# Patient Record
Sex: MALE | Race: WHITE | NOT HISPANIC OR LATINO
[De-identification: names, ages, dates, MRNs, and addresses within clinical notes are randomized per-mention and may not be internally consistent; named-entity substitution may affect disease eponyms.]

---

## 2018-12-01 ENCOUNTER — RECORD ABSTRACTING (OUTPATIENT)
Age: 78
End: 2018-12-01

## 2018-12-01 DIAGNOSIS — Z78.9 OTHER SPECIFIED HEALTH STATUS: ICD-10-CM

## 2018-12-01 DIAGNOSIS — Z82.49 FAMILY HISTORY OF ISCHEMIC HEART DISEASE AND OTHER DISEASES OF THE CIRCULATORY SYSTEM: ICD-10-CM

## 2018-12-04 ENCOUNTER — APPOINTMENT (OUTPATIENT)
Dept: CARDIOLOGY | Facility: CLINIC | Age: 78
End: 2018-12-04
Payer: MEDICARE

## 2018-12-04 VITALS
BODY MASS INDEX: 24.44 KG/M2 | RESPIRATION RATE: 16 BRPM | WEIGHT: 165 LBS | DIASTOLIC BLOOD PRESSURE: 80 MMHG | HEART RATE: 78 BPM | HEIGHT: 69 IN | SYSTOLIC BLOOD PRESSURE: 150 MMHG

## 2018-12-04 DIAGNOSIS — R42 DIZZINESS AND GIDDINESS: ICD-10-CM

## 2018-12-04 DIAGNOSIS — H54.7 UNSPECIFIED VISUAL LOSS: ICD-10-CM

## 2018-12-04 PROCEDURE — 93000 ELECTROCARDIOGRAM COMPLETE: CPT

## 2018-12-04 PROCEDURE — 99214 OFFICE O/P EST MOD 30 MIN: CPT

## 2018-12-04 NOTE — ASSESSMENT
[FreeTextEntry1] : ECG:  Normal sinus rhythm 78.  Poor R-wave progression with ventricular conduction delay.  No significant ST-T wave changes. \par \par Laboratory data was obtained and reviewed dated 11/7/18.\par \par Cholesterol is 214 up from 194. \par \par HDL 64. \par \par  up from 112.  \par \par Creatinine 1.16. \par \par LFTs are normal. \par \par A1C 5.4. \par \par Hemoglobin 15.  \par \par Impression:\par 1.  Episodes of what sounds like vertigo and not syncope.  I discussed these findings with him, the likelihood that they originate from a benign inner ear process.  A cardiac workup would not seem to be necessary. \par 2.  His dilated thoracic aorta will be reassessed by echocardiography. \par 3.  A carotid study will be obtained because of the increase in lipids.  \par 4.  He will adjust his diet and repeat the lipid values in three months.  Clinical follow up after the above.  \par

## 2018-12-04 NOTE — REASON FOR VISIT
[FreeTextEntry1] : This is a 78-year-old male who presents here for cardiac reevaluation.  His history includes:\par 1.  A dilated ascending thoracic aorta. \par 2.  Family history of premature coronary disease. \par 3.  Legal blindness with visual impairment.  \par 4.  History of anxiety and palpitations. \par \par Has had two episodes of falling to the floor after getting up out of bed.  Describes a sensation that the room is moving about him quickly with excessive nausea and vomiting.  Symptoms dissipated after about 40 minutes.  This this has been happening for years and it is not necessarily any different from what he’s had in the past.  \par \par There is no chest pain, no palpitations, no PND, orthopnea, syncope, or near-syncope. \par \par

## 2018-12-04 NOTE — HISTORY OF PRESENT ILLNESS
[FreeTextEntry1] : ECG:  Normal sinus rhythm 78.  Poor R-wave progression with ventricular conduction delay.  No significant ST-T wave changes.   Laboratory data was obtained and reviewed dated 11/7/18.  Cholesterol is 214 up from 194.   HDL 64.    up from 112.    Creatinine 1.16.   LFTs are normal.   A1C 5.4.   Hemoglobin 15.    Impression: 1.  Episodes of what sounds like vertigo and not syncope.  I discussed these findings with him, the likelihood that they originate from a benign inner ear process.  A cardiac workup would not seem to be necessary.  2.  His dilated thoracic aorta will be reassessed by echocardiography.  3.  A carotid study will be obtained because of the increase in lipids.   4.  He will adjust his diet and repeat the lipid values in three months.  Clinical follow up after the above.

## 2018-12-04 NOTE — PHYSICAL EXAM
[General Appearance - Well Developed] : well developed [Normal Appearance] : normal appearance [Well Groomed] : well groomed [General Appearance - Well Nourished] : well nourished [No Deformities] : no deformities [General Appearance - In No Acute Distress] : no acute distress [Normal Oral Mucosa] : normal oral mucosa [No Oral Pallor] : no oral pallor [No Oral Cyanosis] : no oral cyanosis [Normal Jugular Venous A Waves Present] : normal jugular venous A waves present [Normal Jugular Venous V Waves Present] : normal jugular venous V waves present [No Jugular Venous Srinivasan A Waves] : no jugular venous srinivasan A waves [Respiration, Rhythm And Depth] : normal respiratory rhythm and effort [Exaggerated Use Of Accessory Muscles For Inspiration] : no accessory muscle use [Auscultation Breath Sounds / Voice Sounds] : lungs were clear to auscultation bilaterally [Abdomen Soft] : soft [Abdomen Tenderness] : non-tender [Abdomen Mass (___ Cm)] : no abdominal mass palpated [Abnormal Walk] : normal gait [Gait - Sufficient For Exercise Testing] : the gait was sufficient for exercise testing [Nail Clubbing] : no clubbing of the fingernails [Cyanosis, Localized] : no localized cyanosis [Petechial Hemorrhages (___cm)] : no petechial hemorrhages [Skin Color & Pigmentation] : normal skin color and pigmentation [] : no rash [No Venous Stasis] : no venous stasis [Skin Lesions] : no skin lesions [No Skin Ulcers] : no skin ulcer [No Xanthoma] : no  xanthoma was observed [Oriented To Time, Place, And Person] : oriented to person, place, and time [Affect] : the affect was normal [Mood] : the mood was normal [No Anxiety] : not feeling anxious [FreeTextEntry1] :                    Well appearing and nourished with no obvious deformities or distress.\par \par Eyes: \par No conjunctival injection and no xanthelasmas.\par HEENT: \par Normocephalic.Normal oral mucosa. No pallor or cyanosis\par Neck: \par No jugular venous distension. with normal A and V wave forms. No palpable adenopathy.\par Cardiovascular: \par Normal rate and rhythm with normal S1, S2 and a grade 1/6 systolic murmur. Distal arterial pulses are normal. No significant peripheral edema.\par Pulmonary: \par Lungs are clear to auscultation and percussion. Normal respiratory pattern without any accessory muscle use\par Abdomen: \par Soft, non-tender ; no palpable organomegaly or masses.\par Extremities:\par No digital clubbing, cyanosis or ischemic changes.\par Skin: \par No skin lesions, rashes, ulcers or xanthomas.\par Cardiac:\par Nl S1 S2 with a grade 1/6  SHANTAL and no significant  gallops or rubs.

## 2019-03-25 ENCOUNTER — APPOINTMENT (OUTPATIENT)
Dept: CARDIOLOGY | Facility: CLINIC | Age: 79
End: 2019-03-25
Payer: MEDICARE

## 2019-03-25 PROCEDURE — 93306 TTE W/DOPPLER COMPLETE: CPT

## 2019-03-25 PROCEDURE — 93880 EXTRACRANIAL BILAT STUDY: CPT

## 2019-04-02 ENCOUNTER — NON-APPOINTMENT (OUTPATIENT)
Age: 79
End: 2019-04-02

## 2019-04-02 ENCOUNTER — APPOINTMENT (OUTPATIENT)
Dept: CARDIOLOGY | Facility: CLINIC | Age: 79
End: 2019-04-02
Payer: MEDICARE

## 2019-04-02 VITALS
SYSTOLIC BLOOD PRESSURE: 130 MMHG | HEIGHT: 69 IN | WEIGHT: 169 LBS | HEART RATE: 67 BPM | BODY MASS INDEX: 25.03 KG/M2 | DIASTOLIC BLOOD PRESSURE: 70 MMHG | RESPIRATION RATE: 16 BRPM

## 2019-04-02 DIAGNOSIS — K21.9 GASTRO-ESOPHAGEAL REFLUX DISEASE W/OUT ESOPHAGITIS: ICD-10-CM

## 2019-04-02 PROCEDURE — 93000 ELECTROCARDIOGRAM COMPLETE: CPT

## 2019-04-02 PROCEDURE — 99214 OFFICE O/P EST MOD 30 MIN: CPT

## 2019-04-02 RX ORDER — PANTOPRAZOLE SODIUM 20 MG/1
20 TABLET, DELAYED RELEASE ORAL DAILY
Refills: 0 | Status: ACTIVE | COMMUNITY

## 2019-04-02 NOTE — ASSESSMENT
[FreeTextEntry1] : ECG:  Normal sinus rhythm 67 Poor R-wave progression with right ventricular conduction delay.  No significant ST-T wave changes. \par \par Carotid study 3/25/19:\par Mild right internal carotid artery plaquing.\par Left internal carotid artery is clean.\par \par Echocardiogram 3/25/19:\par Left ventricular ejection fraction 55-60%.\par Left atrial enlargement.\par Mild aortic and mitral insufficiency.\par Mild dilatation of the ascending aorta 4 cm unchanged from prior  study\par \par Laboratory data 3/20/19:\par Cholesterol 203\par HDL 63\par \par LFTs normal.\par \par Impression:\par 1.  Previously described pisodes of what sounds like vertigo and not syncope.  I discussed these findings with him, the likelihood that they originate from a benign inner ear process.  A cardiac workup would not seem to be necessary. \par 2.  His dilated thoracic aorta mildly dilated and unchanged  by echocardiography. \par 3.  A carotid study obtained because of the increase in lipids shows minimal disease.  \par 4.  He will adjust his diet and repeat the lipid values in  6 months.  Clinical follow up after the above.  \par

## 2019-04-02 NOTE — REASON FOR VISIT
[FreeTextEntry1] : This is a 78-year-old male who presents here for cardiac reevaluation.  His history includes:\par 1.  A dilated ascending thoracic aorta. \par 2.  Family history of premature coronary disease. \par 3.  Legal blindness with visual impairment.  \par 4.  History of anxiety and palpitations. \par \par No recurent episodes of falling to the floor This has been happening for years and it is not necessarily any different from what he’s had in the past.  \par \par There is no chest pain, no palpitations, no PND, orthopnea, syncope, or near-syncope. \par \par

## 2019-11-14 ENCOUNTER — NON-APPOINTMENT (OUTPATIENT)
Age: 79
End: 2019-11-14

## 2019-11-14 ENCOUNTER — APPOINTMENT (OUTPATIENT)
Dept: CARDIOLOGY | Facility: CLINIC | Age: 79
End: 2019-11-14
Payer: MEDICARE

## 2019-11-14 VITALS
DIASTOLIC BLOOD PRESSURE: 70 MMHG | HEIGHT: 69 IN | HEART RATE: 67 BPM | OXYGEN SATURATION: 98 % | BODY MASS INDEX: 24.44 KG/M2 | WEIGHT: 165 LBS | RESPIRATION RATE: 16 BRPM | SYSTOLIC BLOOD PRESSURE: 130 MMHG

## 2019-11-14 DIAGNOSIS — Z00.00 ENCOUNTER FOR GENERAL ADULT MEDICAL EXAMINATION W/OUT ABNORMAL FINDINGS: ICD-10-CM

## 2019-11-14 DIAGNOSIS — F41.9 ANXIETY DISORDER, UNSPECIFIED: ICD-10-CM

## 2019-11-14 PROCEDURE — 99214 OFFICE O/P EST MOD 30 MIN: CPT

## 2019-11-14 PROCEDURE — 93000 ELECTROCARDIOGRAM COMPLETE: CPT

## 2019-11-14 NOTE — PHYSICAL EXAM
[Normal Appearance] : normal appearance [General Appearance - Well Developed] : well developed [General Appearance - Well Nourished] : well nourished [Well Groomed] : well groomed [General Appearance - In No Acute Distress] : no acute distress [No Deformities] : no deformities [Normal Oral Mucosa] : normal oral mucosa [No Oral Pallor] : no oral pallor [No Oral Cyanosis] : no oral cyanosis [Normal Jugular Venous A Waves Present] : normal jugular venous A waves present [Normal Jugular Venous V Waves Present] : normal jugular venous V waves present [No Jugular Venous Srinivasan A Waves] : no jugular venous srinivasan A waves [Respiration, Rhythm And Depth] : normal respiratory rhythm and effort [Exaggerated Use Of Accessory Muscles For Inspiration] : no accessory muscle use [Auscultation Breath Sounds / Voice Sounds] : lungs were clear to auscultation bilaterally [Abdomen Soft] : soft [Abdomen Mass (___ Cm)] : no abdominal mass palpated [Abdomen Tenderness] : non-tender [Abnormal Walk] : normal gait [Gait - Sufficient For Exercise Testing] : the gait was sufficient for exercise testing [Nail Clubbing] : no clubbing of the fingernails [Cyanosis, Localized] : no localized cyanosis [Petechial Hemorrhages (___cm)] : no petechial hemorrhages [Skin Color & Pigmentation] : normal skin color and pigmentation [No Venous Stasis] : no venous stasis [] : no rash [No Skin Ulcers] : no skin ulcer [Skin Lesions] : no skin lesions [No Xanthoma] : no  xanthoma was observed [Oriented To Time, Place, And Person] : oriented to person, place, and time [Affect] : the affect was normal [Mood] : the mood was normal [No Anxiety] : not feeling anxious [FreeTextEntry1] :                    Well appearing and nourished with no obvious deformities or distress.\par \par Eyes: \par No conjunctival injection and no xanthelasmas.\par HEENT: \par Normocephalic.Normal oral mucosa. No pallor or cyanosis\par Neck: \par No jugular venous distension. with normal A and V wave forms. No palpable adenopathy.\par Cardiovascular: \par Normal rate and rhythm with normal S1, S2 and a grade 1/6 systolic murmur. Distal arterial pulses are normal. No significant peripheral edema.\par Pulmonary: \par Lungs are clear to auscultation and percussion. Normal respiratory pattern without any accessory muscle use\par Abdomen: \par Soft, non-tender ; no palpable organomegaly or masses.\par Extremities:\par No digital clubbing, cyanosis or ischemic changes.\par Skin: \par No skin lesions, rashes, ulcers or xanthomas.\par Cardiac:\par Nl S1 S2 with a grade 1/6  SHANTAL and no significant  gallops or rubs.

## 2019-11-14 NOTE — ASSESSMENT
[FreeTextEntry1] : ECG:  Normal sinus rhythm 67 Poor R-wave progression with right ventricular conduction delay.  No significant ST-T wave changes. \par \par Carotid study 3/25/19:\par Mild right internal carotid artery plaquing.\par Left internal carotid artery is clean.\par \par Echocardiogram 3/25/19:\par Left ventricular ejection fraction 55-60%.\par Left atrial enlargement.\par Mild aortic and mitral insufficiency.\par Mild dilatation of the ascending aorta 4 cm unchanged from prior  study\par \par Laboratory data 3/20/19:\par Cholesterol 203\par HDL 63\par \par LFTs normal.\par \par Impression:\par 1.  No sig recurrence of dizziness\par 2.  His dilated thoracic aorta mildly dilated and unchanged  by echocardiography earlier this year. \par 3.  A carotid study obtained because of the increase in lipids shows minimal disease.  \par 4.  He will adjust his diet and repeat the lipid values in  6 months.  Clinical follow up after the above.  \par \par \par Plan:\par 1.  Will follow-up here in 6 months.\par 2.  We will repeat echocardiogram at that time.\par 3.  Copies of laboratory data from the primary care will be obtained and reviewed.

## 2020-07-14 ENCOUNTER — APPOINTMENT (OUTPATIENT)
Dept: CARDIOLOGY | Facility: CLINIC | Age: 80
End: 2020-07-14

## 2020-09-03 ENCOUNTER — APPOINTMENT (OUTPATIENT)
Dept: CARDIOLOGY | Facility: CLINIC | Age: 80
End: 2020-09-03

## 2020-09-08 ENCOUNTER — APPOINTMENT (OUTPATIENT)
Dept: CARDIOLOGY | Facility: CLINIC | Age: 80
End: 2020-09-08

## 2020-09-09 ENCOUNTER — APPOINTMENT (OUTPATIENT)
Dept: CARDIOLOGY | Facility: CLINIC | Age: 80
End: 2020-09-09

## 2020-11-06 ENCOUNTER — APPOINTMENT (OUTPATIENT)
Dept: CARDIOLOGY | Facility: CLINIC | Age: 80
End: 2020-11-06
Payer: MEDICARE

## 2020-11-06 PROCEDURE — 93306 TTE W/DOPPLER COMPLETE: CPT

## 2020-11-10 ENCOUNTER — APPOINTMENT (OUTPATIENT)
Dept: CARDIOLOGY | Facility: CLINIC | Age: 80
End: 2020-11-10
Payer: MEDICARE

## 2020-11-10 ENCOUNTER — NON-APPOINTMENT (OUTPATIENT)
Age: 80
End: 2020-11-10

## 2020-11-10 VITALS
WEIGHT: 169 LBS | TEMPERATURE: 97.7 F | BODY MASS INDEX: 25.03 KG/M2 | SYSTOLIC BLOOD PRESSURE: 134 MMHG | RESPIRATION RATE: 16 BRPM | HEIGHT: 69 IN | DIASTOLIC BLOOD PRESSURE: 82 MMHG | HEART RATE: 61 BPM

## 2020-11-10 DIAGNOSIS — R00.2 PALPITATIONS: ICD-10-CM

## 2020-11-10 DIAGNOSIS — R01.1 CARDIAC MURMUR, UNSPECIFIED: ICD-10-CM

## 2020-11-10 DIAGNOSIS — R94.31 ABNORMAL ELECTROCARDIOGRAM [ECG] [EKG]: ICD-10-CM

## 2020-11-10 PROCEDURE — 99214 OFFICE O/P EST MOD 30 MIN: CPT

## 2020-11-10 PROCEDURE — 93000 ELECTROCARDIOGRAM COMPLETE: CPT

## 2020-11-10 NOTE — ASSESSMENT
[FreeTextEntry1] : ECG:  Normal sinus rhythm 61 Poor R-wave progression , incomplete right bundle branch block pattern no significant ST-T wave changes. \par \par Carotid study 3/25/19:\par Mild right internal carotid artery plaquing.\par Left internal carotid artery is clean.\par \par Echocardiogram 11/6/2020:\par Normal LV size and function\par Aorta measures 4.2 at the root 4.1 ascending\par Mild to moderate mitral and aortic insufficiency\par Left atrial enlargement\par Compared to the prior study there is no change in the aorta but there is been some progression of the mitral and aortic insufficiency.\par \par Echocardiogram 3/25/19:\par Left ventricular ejection fraction 55-60%.\par Left atrial enlargement.\par Mild aortic and mitral insufficiency.\par Mild dilatation of the ascending aorta 4 cm unchanged from prior  study\par \par Laboratory data 3/20/19: No current data\par Cholesterol 203\par HDL 63\par \par LFTs normal.\par \par Impression:\par 1.  No sig recurrence of dizziness\par 2.  His dilated thoracic aorta mildly dilated and unchanged  by echocardiography \par 3.  A carotid study obtained because of the increase in lipids showed minimal disease.  \par 4.  Mild progression of the mitral and aortic insufficiency is noted.\par He will adjust his diet and repeat the lipid values through PMD shortly.  Clinical follow up after the above.  \par \par \par Plan:\par 1.  Will follow-up here in 6 months.\par 2.  We will repeat echocardiogram in 1 year\par 3.  Copies of laboratory data from the primary care will be obtained and reviewed.

## 2020-11-10 NOTE — PHYSICAL EXAM
[General Appearance - Well Developed] : well developed [Normal Appearance] : normal appearance [Well Groomed] : well groomed [General Appearance - Well Nourished] : well nourished [No Deformities] : no deformities [General Appearance - In No Acute Distress] : no acute distress [Normal Oral Mucosa] : normal oral mucosa [No Oral Pallor] : no oral pallor [No Oral Cyanosis] : no oral cyanosis [Normal Jugular Venous A Waves Present] : normal jugular venous A waves present [Normal Jugular Venous V Waves Present] : normal jugular venous V waves present [No Jugular Venous Srinivasan A Waves] : no jugular venous srinivasna A waves [Respiration, Rhythm And Depth] : normal respiratory rhythm and effort [Exaggerated Use Of Accessory Muscles For Inspiration] : no accessory muscle use [Auscultation Breath Sounds / Voice Sounds] : lungs were clear to auscultation bilaterally [Abdomen Soft] : soft [Abdomen Tenderness] : non-tender [Abdomen Mass (___ Cm)] : no abdominal mass palpated [Abnormal Walk] : normal gait [Gait - Sufficient For Exercise Testing] : the gait was sufficient for exercise testing [Nail Clubbing] : no clubbing of the fingernails [Cyanosis, Localized] : no localized cyanosis [Petechial Hemorrhages (___cm)] : no petechial hemorrhages [Skin Color & Pigmentation] : normal skin color and pigmentation [] : no rash [No Venous Stasis] : no venous stasis [Skin Lesions] : no skin lesions [No Skin Ulcers] : no skin ulcer [No Xanthoma] : no  xanthoma was observed [Oriented To Time, Place, And Person] : oriented to person, place, and time [Affect] : the affect was normal [Mood] : the mood was normal [No Anxiety] : not feeling anxious [FreeTextEntry1] :                    Well appearing and nourished with no obvious deformities or distress.\par \par Eyes: \par No conjunctival injection and no xanthelasmas.\par HEENT: \par Normocephalic.Normal oral mucosa. No pallor or cyanosis\par Neck: \par No jugular venous distension. with normal A and V wave forms. No palpable adenopathy.\par Cardiovascular: \par Normal rate and rhythm with normal S1, S2 and a grade 1/6 systolic murmur. Distal arterial pulses are normal. No significant peripheral edema.\par Pulmonary: \par Lungs are clear to auscultation and percussion. Normal respiratory pattern without any accessory muscle use\par Abdomen: \par Soft, non-tender ; no palpable organomegaly or masses.\par Extremities:\par No digital clubbing, cyanosis or ischemic changes.\par Skin: \par No skin lesions, rashes, ulcers or xanthomas.\par Cardiac:\par Nl S1 S2 with a grade 1/6  SHANTAL and no significant  gallops or rubs.

## 2020-11-10 NOTE — REASON FOR VISIT
[FreeTextEntry1] : This is a 80 -year-old male who presents here for cardiac reevaluation.  His history includes:\par 1.  A dilated ascending thoracic aorta. \par 2.  Family history of premature coronary disease. \par 3.  Legal blindness with visual impairment.  \par 4.  History of anxiety and palpitations. \par \par No recurent episodes of falling to the floor This has been happening for years and it is not necessarily any different from what he’s had in the past.  \par \par There is no chest pain, no palpitations, no PND, orthopnea, syncope, or near-syncope. \par \par

## 2023-05-22 ENCOUNTER — OFFICE (OUTPATIENT)
Dept: URBAN - METROPOLITAN AREA CLINIC 104 | Facility: CLINIC | Age: 83
Setting detail: OPHTHALMOLOGY
End: 2023-05-22
Payer: MEDICARE

## 2023-05-22 DIAGNOSIS — H16.223: ICD-10-CM

## 2023-05-22 DIAGNOSIS — H52.13: ICD-10-CM

## 2023-05-22 DIAGNOSIS — H35.53: ICD-10-CM

## 2023-05-22 DIAGNOSIS — H43.813: ICD-10-CM

## 2023-05-22 DIAGNOSIS — H25.13: ICD-10-CM

## 2023-05-22 DIAGNOSIS — H35.54: ICD-10-CM

## 2023-05-22 PROCEDURE — 92014 COMPRE OPH EXAM EST PT 1/>: CPT | Performed by: OPHTHALMOLOGY

## 2023-05-22 PROCEDURE — 92134 CPTRZ OPH DX IMG PST SGM RTA: CPT | Performed by: OPHTHALMOLOGY

## 2023-05-22 PROCEDURE — 92015 DETERMINE REFRACTIVE STATE: CPT | Performed by: OPHTHALMOLOGY

## 2023-05-22 ASSESSMENT — TONOMETRY
OS_IOP_MMHG: 14
OD_IOP_MMHG: 14

## 2023-05-22 ASSESSMENT — REFRACTION_MANIFEST
OS_VA1: 20/FC
OS_CYLINDER: -0.50
OS_SPHERE: -1.25
OD_SPHERE: -0.50
OD_AXIS: 110
OD_VA1: 20/FC
OD_CYLINDER: -0.75
OS_AXIS: 070

## 2023-05-22 ASSESSMENT — SPHEQUIV_DERIVED
OS_SPHEQUIV: -1.75
OS_SPHEQUIV: -1.5
OD_SPHEQUIV: -0.875

## 2023-05-22 ASSESSMENT — REFRACTION_AUTOREFRACTION
OS_AXIS: 061
OS_CYLINDER: -0.50
OD_CYLINDER: -1.00
OD_SPHERE: PLANO
OS_SPHERE: -1.50
OD_AXIS: 105

## 2023-05-22 ASSESSMENT — KERATOMETRY
OD_K2POWER_DIOPTERS: 42.94
OD_AXISANGLE_DEGREES: 039
OS_K2POWER_DIOPTERS: 42.83
OD_K1POWER_DIOPTERS: 42.61
OS_AXISANGLE_DEGREES: 171
OS_K1POWER_DIOPTERS: 42.51

## 2023-05-22 ASSESSMENT — LID EXAM ASSESSMENTS
OS_COMMENTS: WOUND C/D/I, HEALING WELL
OS_TRICHIASIS: ABSENT
OD_TRICHIASIS: ABSENT

## 2023-05-22 ASSESSMENT — AXIALLENGTH_DERIVED
OS_AL: 24.516
OD_AL: 24.22
OS_AL: 24.6216

## 2023-05-22 ASSESSMENT — LID POSITION - DERMATOCHALASIS
OS_DERMATOCHALASIS: LUL 2+
OD_DERMATOCHALASIS: RUL 2+

## 2023-05-22 ASSESSMENT — VISUAL ACUITY
OD_BCVA: 20/FC
OS_BCVA: 20/FC

## 2023-05-22 ASSESSMENT — CONFRONTATIONAL VISUAL FIELD TEST (CVF)
OD_FINDINGS: FULL
OS_FINDINGS: FULL

## 2023-05-22 ASSESSMENT — SUPERFICIAL PUNCTATE KERATITIS (SPK)
OS_SPK: 1+
OD_SPK: 1+

## 2024-04-16 ENCOUNTER — APPOINTMENT (OUTPATIENT)
Dept: CARDIOLOGY | Facility: CLINIC | Age: 84
End: 2024-04-16
Payer: MEDICARE

## 2024-04-16 VITALS
WEIGHT: 170 LBS | BODY MASS INDEX: 25.18 KG/M2 | DIASTOLIC BLOOD PRESSURE: 80 MMHG | HEIGHT: 69 IN | HEART RATE: 65 BPM | OXYGEN SATURATION: 98 % | SYSTOLIC BLOOD PRESSURE: 140 MMHG | RESPIRATION RATE: 16 BRPM

## 2024-04-16 PROCEDURE — 93000 ELECTROCARDIOGRAM COMPLETE: CPT

## 2024-04-16 PROCEDURE — 99214 OFFICE O/P EST MOD 30 MIN: CPT

## 2024-04-16 PROCEDURE — G2211 COMPLEX E/M VISIT ADD ON: CPT

## 2024-04-16 NOTE — REASON FOR VISIT
[FreeTextEntry1] : This is a 83 -year-old male who presents here for cardiac reevaluation.   Last seen here 11/10/2020.  His history includes: 1.  A dilated ascending thoracic aorta.  2.  Family history of premature coronary disease.  3.  Legal blindness with visual impairment.   4.  History of anxiety and palpitations.   No recurent episodes of falling to the floor This has been happening for years and it is not necessarily any different from what he's had in the past.    There is no chest pain, no palpitations, no PND, orthopnea, syncope, or near-syncope.

## 2024-04-16 NOTE — PHYSICAL EXAM
[Normal Appearance] : normal appearance [General Appearance - Well Developed] : well developed [Well Groomed] : well groomed [No Deformities] : no deformities [General Appearance - Well Nourished] : well nourished [General Appearance - In No Acute Distress] : no acute distress [Normal Oral Mucosa] : normal oral mucosa [No Oral Pallor] : no oral pallor [No Oral Cyanosis] : no oral cyanosis [Normal Jugular Venous A Waves Present] : normal jugular venous A waves present [Normal Jugular Venous V Waves Present] : normal jugular venous V waves present [No Jugular Venous Srinivasan A Waves] : no jugular venous srinivasan A waves [Respiration, Rhythm And Depth] : normal respiratory rhythm and effort [Auscultation Breath Sounds / Voice Sounds] : lungs were clear to auscultation bilaterally [Exaggerated Use Of Accessory Muscles For Inspiration] : no accessory muscle use [Abdomen Soft] : soft [Abdomen Tenderness] : non-tender [Abdomen Mass (___ Cm)] : no abdominal mass palpated [Abnormal Walk] : normal gait [Gait - Sufficient For Exercise Testing] : the gait was sufficient for exercise testing [Nail Clubbing] : no clubbing of the fingernails [Cyanosis, Localized] : no localized cyanosis [Petechial Hemorrhages (___cm)] : no petechial hemorrhages [Skin Color & Pigmentation] : normal skin color and pigmentation [] : no rash [No Venous Stasis] : no venous stasis [Skin Lesions] : no skin lesions [No Skin Ulcers] : no skin ulcer [No Xanthoma] : no  xanthoma was observed [Oriented To Time, Place, And Person] : oriented to person, place, and time [Affect] : the affect was normal [Mood] : the mood was normal [No Anxiety] : not feeling anxious [FreeTextEntry1] :                    Well appearing and nourished with no obvious deformities or distress.\par  \par  Eyes: \par  No conjunctival injection and no xanthelasmas.\par  HEENT: \par  Normocephalic.Normal oral mucosa. No pallor or cyanosis\par  Neck: \par  No jugular venous distension. with normal A and V wave forms. No palpable adenopathy.\par  Cardiovascular: \par  Normal rate and rhythm with normal S1, S2 and a grade 1/6 systolic murmur. Distal arterial pulses are normal. No significant peripheral edema.\par  Pulmonary: \par  Lungs are clear to auscultation and percussion. Normal respiratory pattern without any accessory muscle use\par  Abdomen: \par  Soft, non-tender ; no palpable organomegaly or masses.\par  Extremities:\par  No digital clubbing, cyanosis or ischemic changes.\par  Skin: \par  No skin lesions, rashes, ulcers or xanthomas.\par  Cardiac:\par  Nl S1 S2 with a grade 1/6  SHANTAL and no significant  gallops or rubs.

## 2024-04-16 NOTE — ASSESSMENT
[FreeTextEntry1] : ECG:  Normal sinus rhythm 61 Poor R-wave progression , incomplete right bundle branch block pattern no significant ST-T wave changes.  Poor R progression but doubt septal infarct.  Carotid study 3/25/19: Mild right internal carotid artery plaquing. Left internal carotid artery is clean.  Echocardiogram 11/6/2020: Normal LV size and function Aorta measures 4.2 at the root 4.1 ascending Mild to moderate mitral and aortic insufficiency Left atrial enlargement Compared to the prior study there is no change in the aorta but there is been some progression of the mitral and aortic insufficiency.  Echocardiogram 3/25/19: Left ventricular ejection fraction 55-60%. Left atrial enlargement. Mild aortic and mitral insufficiency. Mild dilatation of the ascending aorta 4 cm unchanged from prior  study  Laboratory data  ------3/20/19--11/21/23--1/27/24-- Chol---203------220----------224 HDL----63-------48------------53 LDL---127------150-----------148 LFTs normal.  Impression: 1.  No sig recurrence of dizziness 2.  His dilated thoracic aorta mildly dilated last assessed November 2020 3.  A carotid study obtained because of the increase in lipids showed minimal disease.   4.  Mild progression of the mitral and aortic insufficiency is noted. 5.  Hyperlipidemia: Further elevation of his lipids currently taking red yeast rice only.  Has been somewhat resistant to pharmacotherapy.  Plan: 1.  CT CAC to be obtained to further risk stratify and define appropriate treatment of his hyperlipidemia. 2.  We will repeat echocardiogram by this summer. 3.  Copies of laboratory data from the primary care will be obtained and reviewed.

## 2024-05-17 ENCOUNTER — APPOINTMENT (OUTPATIENT)
Dept: CARDIOLOGY | Facility: CLINIC | Age: 84
End: 2024-05-17
Payer: MEDICARE

## 2024-05-17 PROCEDURE — 93306 TTE W/DOPPLER COMPLETE: CPT

## 2024-05-23 ENCOUNTER — APPOINTMENT (OUTPATIENT)
Dept: CARDIOLOGY | Facility: CLINIC | Age: 84
End: 2024-05-23
Payer: MEDICARE

## 2024-05-23 VITALS
DIASTOLIC BLOOD PRESSURE: 78 MMHG | BODY MASS INDEX: 25.25 KG/M2 | WEIGHT: 171 LBS | RESPIRATION RATE: 15 BRPM | HEART RATE: 70 BPM | SYSTOLIC BLOOD PRESSURE: 146 MMHG

## 2024-05-23 DIAGNOSIS — I35.1 NONRHEUMATIC AORTIC (VALVE) INSUFFICIENCY: ICD-10-CM

## 2024-05-23 DIAGNOSIS — I77.810 THORACIC AORTIC ECTASIA: ICD-10-CM

## 2024-05-23 DIAGNOSIS — I34.0 NONRHEUMATIC MITRAL (VALVE) INSUFFICIENCY: ICD-10-CM

## 2024-05-23 DIAGNOSIS — I10 ESSENTIAL (PRIMARY) HYPERTENSION: ICD-10-CM

## 2024-05-23 DIAGNOSIS — E78.5 HYPERLIPIDEMIA, UNSPECIFIED: ICD-10-CM

## 2024-05-23 PROCEDURE — 99214 OFFICE O/P EST MOD 30 MIN: CPT

## 2024-05-23 PROCEDURE — G2211 COMPLEX E/M VISIT ADD ON: CPT

## 2024-05-23 RX ORDER — ROSUVASTATIN CALCIUM 5 MG/1
5 TABLET, FILM COATED ORAL
Qty: 90 | Refills: 1 | Status: ACTIVE | COMMUNITY
Start: 2024-05-23 | End: 1900-01-01

## 2024-05-23 RX ORDER — AMLODIPINE BESYLATE 5 MG/1
5 TABLET ORAL
Qty: 90 | Refills: 1 | Status: ACTIVE | COMMUNITY
Start: 2024-05-23 | End: 1900-01-01

## 2024-05-23 NOTE — PHYSICAL EXAM
[General Appearance - Well Developed] : well developed [Normal Appearance] : normal appearance [Well Groomed] : well groomed [General Appearance - Well Nourished] : well nourished [No Deformities] : no deformities [General Appearance - In No Acute Distress] : no acute distress [Normal Oral Mucosa] : normal oral mucosa [No Oral Pallor] : no oral pallor [No Oral Cyanosis] : no oral cyanosis [Normal Jugular Venous A Waves Present] : normal jugular venous A waves present [Normal Jugular Venous V Waves Present] : normal jugular venous V waves present [No Jugular Venous Srinivasan A Waves] : no jugular venous srinivasan A waves [Respiration, Rhythm And Depth] : normal respiratory rhythm and effort [Exaggerated Use Of Accessory Muscles For Inspiration] : no accessory muscle use [Auscultation Breath Sounds / Voice Sounds] : lungs were clear to auscultation bilaterally [Abnormal Walk] : normal gait [Gait - Sufficient For Exercise Testing] : the gait was sufficient for exercise testing [Nail Clubbing] : no clubbing of the fingernails [Cyanosis, Localized] : no localized cyanosis [Petechial Hemorrhages (___cm)] : no petechial hemorrhages [Skin Color & Pigmentation] : normal skin color and pigmentation [] : no rash [No Venous Stasis] : no venous stasis [Skin Lesions] : no skin lesions [No Skin Ulcers] : no skin ulcer [No Xanthoma] : no  xanthoma was observed [Oriented To Time, Place, And Person] : oriented to person, place, and time [Affect] : the affect was normal [Mood] : the mood was normal [No Anxiety] : not feeling anxious [Heart Rate And Rhythm] : heart rate and rhythm were normal [Heart Sounds] : normal S1 and S2 [Edema] : no peripheral edema present [FreeTextEntry1] : I/VI systolic murmur

## 2024-05-23 NOTE — ASSESSMENT
[FreeTextEntry1] : Carotid study 3/25/19: Mild right internal carotid artery plaquing. Left internal carotid artery is clean.  ECHOCARDIOGRAM 5/17/2024: LVEF 55-60%, LV caivty normal in size LA mildly dilated RA normal in size Trace MR Mild to moderate AI Aortic root 4.5 cm Ascending aorta measuring 4.4 cm  Echocardiogram 11/6/2020: Normal LV size and function Aorta measures 4.2 at the root 4.1 ascending Mild to moderate mitral and aortic insufficiency Left atrial enlargement Compared to the prior study there is no change in the aorta but there is been some progression of the mitral and aortic insufficiency.  Echocardiogram 3/25/19: Left ventricular ejection fraction 55-60%. Left atrial enlargement. Mild aortic and mitral insufficiency. Mild dilatation of the ascending aorta 4 cm unchanged from prior  study  CT CALCIUM SCORE 5/2024: Total Ca: 0  Laboratory data  ------3/20/19--11/21/23--1/27/24-- Chol---203------220----------224 HDL----63-------48------------53 LDL---127------150-----------148 LFTs normal.  Impression: 1. Progression of thoracic aorta dilatation on echocardiogram. Blood pressure elevated.  2. MR now mild. AI remains mild to moderate.  3. CT calcium score of 0 suggesting lower risk, however LDL remains elevated.  4. No anginal symptoms.   Plan: 1.  Cardiac CTA to reassess thoracic aorta dilatation given progression. Also check abdominal aorta US to assess for AAA.  2. Start Amlodipine 5 mg for HTN. Importance of blood pressure control given aortic dilatation discussed.  3. Start Rosuvastatin 5 mg and repeat lipid panel in 3 months. Follow a low-fat, low-carbohydrate diet.  4. Pt advised to continue to exercise for at least 30 minutes most days of the week. Any cardiac symptoms such as chest pain, palpitations or new shortness of breath should be reported.  Clinical follow-up in 4 months or sooner if needed.

## 2024-05-23 NOTE — HISTORY OF PRESENT ILLNESS
[FreeTextEntry1] : Patient reports feeling well. Continues to exercise. Rows 1/2 hour daily and has no exertional symptoms. He denies any chest pain, dizziness, syncope, near-syncope, SOB, edema, orthopnea or PND.  Reports home BP typically 135/80.

## 2024-05-23 NOTE — REASON FOR VISIT
[FreeTextEntry1] : ANILA SCHULTZ is a 83 year-old  M presents here for cardiac follow-up and to discuss the results of his echocardiogram and coronary calcium score.  His medical history includes:  1.  A dilated ascending thoracic aorta.  2.  Family history of premature coronary disease.  3.  Legal blindness with visual impairment.   4.  History of anxiety and palpitations.

## 2024-05-24 ENCOUNTER — RX ONLY (RX ONLY)
Age: 84
End: 2024-05-24

## 2024-05-24 ENCOUNTER — OFFICE (OUTPATIENT)
Dept: URBAN - METROPOLITAN AREA CLINIC 104 | Facility: CLINIC | Age: 84
Setting detail: OPHTHALMOLOGY
End: 2024-05-24
Payer: MEDICARE

## 2024-05-24 DIAGNOSIS — H43.813: ICD-10-CM

## 2024-05-24 DIAGNOSIS — H25.13: ICD-10-CM

## 2024-05-24 DIAGNOSIS — H35.53: ICD-10-CM

## 2024-05-24 DIAGNOSIS — H35.54: ICD-10-CM

## 2024-05-24 DIAGNOSIS — H16.223: ICD-10-CM

## 2024-05-24 PROCEDURE — 92014 COMPRE OPH EXAM EST PT 1/>: CPT | Performed by: OPHTHALMOLOGY

## 2024-05-24 ASSESSMENT — LID POSITION - DERMATOCHALASIS
OS_DERMATOCHALASIS: LUL 2+
OD_DERMATOCHALASIS: RUL 2+

## 2024-05-24 ASSESSMENT — LID EXAM ASSESSMENTS
OS_COMMENTS: WOUND C/D/I, HEALING WELL
OS_TRICHIASIS: ABSENT
OD_TRICHIASIS: ABSENT

## 2024-10-09 ENCOUNTER — APPOINTMENT (OUTPATIENT)
Dept: CARDIOLOGY | Facility: CLINIC | Age: 84
End: 2024-10-09
Payer: MEDICARE

## 2024-10-09 PROCEDURE — 93978 VASCULAR STUDY: CPT

## 2024-11-14 ENCOUNTER — APPOINTMENT (OUTPATIENT)
Dept: CARDIOLOGY | Facility: CLINIC | Age: 84
End: 2024-11-14
Payer: MEDICARE

## 2024-11-14 VITALS
WEIGHT: 173 LBS | RESPIRATION RATE: 16 BRPM | BODY MASS INDEX: 25.62 KG/M2 | HEIGHT: 69 IN | OXYGEN SATURATION: 98 % | HEART RATE: 65 BPM | SYSTOLIC BLOOD PRESSURE: 132 MMHG | DIASTOLIC BLOOD PRESSURE: 70 MMHG

## 2024-11-14 DIAGNOSIS — I77.810 THORACIC AORTIC ECTASIA: ICD-10-CM

## 2024-11-14 DIAGNOSIS — I35.1 NONRHEUMATIC AORTIC (VALVE) INSUFFICIENCY: ICD-10-CM

## 2024-11-14 DIAGNOSIS — R00.2 PALPITATIONS: ICD-10-CM

## 2024-11-14 DIAGNOSIS — I34.0 NONRHEUMATIC MITRAL (VALVE) INSUFFICIENCY: ICD-10-CM

## 2024-11-14 DIAGNOSIS — E78.5 HYPERLIPIDEMIA, UNSPECIFIED: ICD-10-CM

## 2024-11-14 DIAGNOSIS — I10 ESSENTIAL (PRIMARY) HYPERTENSION: ICD-10-CM

## 2024-11-14 PROCEDURE — G2211 COMPLEX E/M VISIT ADD ON: CPT

## 2024-11-14 PROCEDURE — 99214 OFFICE O/P EST MOD 30 MIN: CPT

## 2024-11-14 PROCEDURE — 93000 ELECTROCARDIOGRAM COMPLETE: CPT

## 2025-01-06 ENCOUNTER — APPOINTMENT (OUTPATIENT)
Dept: ORTHOPEDIC SURGERY | Facility: CLINIC | Age: 85
End: 2025-01-06
Payer: MEDICARE

## 2025-01-06 DIAGNOSIS — M25.511 PAIN IN RIGHT SHOULDER: ICD-10-CM

## 2025-01-06 DIAGNOSIS — M75.121 COMPLETE ROTATOR CUFF TEAR OR RUPTURE OF RIGHT SHOULDER, NOT SPECIFIED AS TRAUMATIC: ICD-10-CM

## 2025-01-06 DIAGNOSIS — M75.51 BURSITIS OF RIGHT SHOULDER: ICD-10-CM

## 2025-01-06 PROCEDURE — 73010 X-RAY EXAM OF SHOULDER BLADE: CPT | Mod: RT

## 2025-01-06 PROCEDURE — 20610 DRAIN/INJ JOINT/BURSA W/O US: CPT | Mod: RT

## 2025-01-06 PROCEDURE — 73030 X-RAY EXAM OF SHOULDER: CPT | Mod: RT

## 2025-01-06 PROCEDURE — 99204 OFFICE O/P NEW MOD 45 MIN: CPT | Mod: 25,57

## 2025-04-24 ENCOUNTER — NON-APPOINTMENT (OUTPATIENT)
Age: 85
End: 2025-04-24

## 2025-04-25 ENCOUNTER — APPOINTMENT (OUTPATIENT)
Dept: CARDIOLOGY | Facility: CLINIC | Age: 85
End: 2025-04-25
Payer: MEDICARE

## 2025-04-25 VITALS
RESPIRATION RATE: 16 BRPM | HEIGHT: 69 IN | DIASTOLIC BLOOD PRESSURE: 80 MMHG | OXYGEN SATURATION: 98 % | BODY MASS INDEX: 25.62 KG/M2 | HEART RATE: 64 BPM | WEIGHT: 173 LBS | SYSTOLIC BLOOD PRESSURE: 142 MMHG

## 2025-04-25 DIAGNOSIS — I35.1 NONRHEUMATIC AORTIC (VALVE) INSUFFICIENCY: ICD-10-CM

## 2025-04-25 DIAGNOSIS — I34.0 NONRHEUMATIC MITRAL (VALVE) INSUFFICIENCY: ICD-10-CM

## 2025-04-25 DIAGNOSIS — I10 ESSENTIAL (PRIMARY) HYPERTENSION: ICD-10-CM

## 2025-04-25 DIAGNOSIS — I77.810 THORACIC AORTIC ECTASIA: ICD-10-CM

## 2025-04-25 DIAGNOSIS — E78.5 HYPERLIPIDEMIA, UNSPECIFIED: ICD-10-CM

## 2025-04-25 PROCEDURE — 93000 ELECTROCARDIOGRAM COMPLETE: CPT

## 2025-04-25 PROCEDURE — 99214 OFFICE O/P EST MOD 30 MIN: CPT

## 2025-04-25 PROCEDURE — G2211 COMPLEX E/M VISIT ADD ON: CPT

## 2025-04-30 ENCOUNTER — APPOINTMENT (OUTPATIENT)
Dept: CARDIOLOGY | Facility: CLINIC | Age: 85
End: 2025-04-30

## 2025-05-28 ENCOUNTER — OFFICE (OUTPATIENT)
Dept: URBAN - METROPOLITAN AREA CLINIC 104 | Facility: CLINIC | Age: 85
Setting detail: OPHTHALMOLOGY
End: 2025-05-28
Payer: MEDICARE

## 2025-05-28 DIAGNOSIS — H25.13: ICD-10-CM

## 2025-05-28 DIAGNOSIS — H35.54: ICD-10-CM

## 2025-05-28 DIAGNOSIS — H35.53: ICD-10-CM

## 2025-05-28 DIAGNOSIS — H16.223: ICD-10-CM

## 2025-05-28 DIAGNOSIS — H52.4: ICD-10-CM

## 2025-05-28 DIAGNOSIS — H43.813: ICD-10-CM

## 2025-05-28 PROCEDURE — 92134 CPTRZ OPH DX IMG PST SGM RTA: CPT | Performed by: OPHTHALMOLOGY

## 2025-05-28 PROCEDURE — 92015 DETERMINE REFRACTIVE STATE: CPT | Performed by: OPHTHALMOLOGY

## 2025-05-28 PROCEDURE — 92014 COMPRE OPH EXAM EST PT 1/>: CPT | Performed by: OPHTHALMOLOGY

## 2025-05-28 ASSESSMENT — SUPERFICIAL PUNCTATE KERATITIS (SPK)
OD_SPK: T
OS_SPK: T

## 2025-05-28 ASSESSMENT — REFRACTION_MANIFEST
OD_SPHERE: +0.75
OS_VA1: 20/300
OD_AXIS: 085
OU_VA: 20/200
OS_ADD: +2.75
OD_VA2: 20/400
OS_SPHERE: -1.00
OD_ADD: +2.75
OD_CYLINDER: -1.25
OD_VA1: 20/300
OS_VA2: 20/400
OS_AXIS: 065
OS_CYLINDER: -0.75

## 2025-05-28 ASSESSMENT — REFRACTION_AUTOREFRACTION
OD_CYLINDER: -1.50
OD_AXIS: 085
OS_AXIS: 066
OS_CYLINDER: -0.75
OD_SPHERE: +1.00
OS_SPHERE: -1.25

## 2025-05-28 ASSESSMENT — KERATOMETRY
OD_K1POWER_DIOPTERS: 42.25
OD_K2POWER_DIOPTERS: 42.75
OS_AXISANGLE_DEGREES: 154
OD_AXISANGLE_DEGREES: 011
OS_K1POWER_DIOPTERS: 41.50
OS_K2POWER_DIOPTERS: 42.25

## 2025-05-28 ASSESSMENT — LID POSITION - DERMATOCHALASIS
OD_DERMATOCHALASIS: RUL 2+
OS_DERMATOCHALASIS: LUL 2+

## 2025-05-28 ASSESSMENT — TONOMETRY
OS_IOP_MMHG: 14
OD_IOP_MMHG: 14

## 2025-05-28 ASSESSMENT — VISUAL ACUITY
OS_BCVA: CF 3FT
OD_BCVA: CF 3FT

## 2025-05-28 ASSESSMENT — LID EXAM ASSESSMENTS
OS_COMMENTS: WOUND C/D/I, HEALING WELL
OD_TRICHIASIS: ABSENT
OS_TRICHIASIS: ABSENT

## 2025-05-28 ASSESSMENT — CONFRONTATIONAL VISUAL FIELD TEST (CVF)
OS_FINDINGS: FULL
OD_FINDINGS: FULL